# Patient Record
Sex: FEMALE | Race: WHITE | NOT HISPANIC OR LATINO | Employment: FULL TIME | URBAN - NONMETROPOLITAN AREA
[De-identification: names, ages, dates, MRNs, and addresses within clinical notes are randomized per-mention and may not be internally consistent; named-entity substitution may affect disease eponyms.]

---

## 2020-11-11 ENCOUNTER — PREP FOR SURGERY (OUTPATIENT)
Dept: OTHER | Facility: HOSPITAL | Age: 45
End: 2020-11-11

## 2020-11-11 DIAGNOSIS — N20.0 CALCULUS OF KIDNEY: Primary | ICD-10-CM

## 2020-11-13 PROBLEM — N20.0 CALCULUS OF KIDNEY: Status: ACTIVE | Noted: 2020-11-13

## 2020-11-16 ENCOUNTER — LAB (OUTPATIENT)
Dept: LAB | Facility: HOSPITAL | Age: 45
End: 2020-11-16

## 2020-11-16 DIAGNOSIS — Z01.818 PREOP TESTING: Primary | ICD-10-CM

## 2020-11-16 PROCEDURE — U0003 INFECTIOUS AGENT DETECTION BY NUCLEIC ACID (DNA OR RNA); SEVERE ACUTE RESPIRATORY SYNDROME CORONAVIRUS 2 (SARS-COV-2) (CORONAVIRUS DISEASE [COVID-19]), AMPLIFIED PROBE TECHNIQUE, MAKING USE OF HIGH THROUGHPUT TECHNOLOGIES AS DESCRIBED BY CMS-2020-01-R: HCPCS

## 2020-11-16 PROCEDURE — C9803 HOPD COVID-19 SPEC COLLECT: HCPCS

## 2020-11-17 LAB
COVID LABCORP PRIORITY: NORMAL
SARS-COV-2 RNA RESP QL NAA+PROBE: NOT DETECTED

## 2020-11-18 RX ORDER — ELETRIPTAN HYDROBROMIDE 40 MG/1
40 TABLET, FILM COATED ORAL ONCE AS NEEDED
COMMUNITY

## 2020-11-18 RX ORDER — LEVONORGESTREL / ETHINYL ESTRADIOL AND ETHINYL ESTRADIOL 150-30(84)
KIT ORAL DAILY
COMMUNITY

## 2020-11-18 RX ORDER — VENLAFAXINE HYDROCHLORIDE 150 MG/1
150 CAPSULE, EXTENDED RELEASE ORAL DAILY
COMMUNITY

## 2020-11-18 NOTE — H&P
[facility] History and Physical  HUSSEIN TAFOYA  45-year-old; :1975;;female  [address];[place] [address]  Ins: 1) KER[employer]/ELIGIO  Employer: [employer]  MRN:[id number]  NATALIIA BRADSHAW MD  [facility]  Allergies  Ceclor Unknown  Current Medications  Effexor XR 75 mg oral capsule, extended release  Relpax 40 mg oral tablet  * Medications Reconciled  Patient is currently taking birth control as well  Problem List  Kidney stone  Medical History  No medical history.  Patient reports having had a colonoscopy within  the past 9 years.  Surgical History  WISDOM TEETH REMOVED   Other  DNC 10/2010  Obstetric/Gynecologic History  Patient has regular menstrual periods and states  there is no chance she may be pregnant.  Psychiatric History  Patient is generally satisfied with life and has no  thoughts of suicide. Has depression and anxiety.  Family History  Family history of cancer  UTERINE GRANDMOTHER  Father Heart disease  Father Diabetes mellitus  Mother  Father Alive  Social History  Never smoked. Drinks 1-2 alcoholic  drinks/month. Employed. . Lives with her .  Imm/Inj/Office Meds History Comments  Patient has had influenza vaccination for this season.  Has not had pneumonia vaccine.  Chief Complaint  Kidney Stone  History of Present Illness  HUSSEIN TAFOYA is a 45-year-old female here for evaluation. She has a history of kidney stone. ESWL done on left 10/19/20. KUB shows stone on left. C/O left sided pain. Denies N/V. Symptoms present since Sept. Would like to have stone removed. No currently on Flomax. States stone was 5mm on last imaging.  Location: Kidney.  Severity: moderate  Onset / Duration: 2020  Modifying factors: ESWL  Associated signs and symptoms: No dysuria noted. She has 1-2 nocturia per night. She has no frequency. There is no incontinence. There is no urgency. There is no hematuria. There is no  hesitancy. She has a strong stream. She has good control. She has no odor. She always empties well.  Review of Systems  Eyes: Denies: blurry vision, pain in the eyes and double vision  ENMT: Denies: ear pain, sore throat and sinus problems  Cardiovascular: Denies: chest pain, varicose veins, angina and syncope  Respiratory: Denies: shortness of breath, wheezing and frequent cough  Gastrointestinal: Reports: abdominal pain, nausea and vomiting; Denies: indigestion and heartburn  Genitourinary: Reports: other symptoms (see HPI)  Musculoskeletal: Reports: joint pain, neck pain and back pain  Integumentary: Denies: skin rash, boils and persistent itch  Neurological: Reports: numbness / tingling; Denies: tremors and dizzy spells  Psychiatric: Reports: generally satisfied with life; Denies: depression, suicidal  ideation and anxiety  Endocrine: Reports: heat intolerance; Denies: Excessive thirst, cold intolerance and tired/sluggish  Hematologic/Lymphatic: Denies: swollen glands and blood clotting problem  Allergic/Immunologic: Denies: hayfever  Constitutional: Denies: fever, chills and weight loss  Vital Signs  VITAL SIGNS NOT TAKEN DUE TO COVID 19 RESTRICTIONS  11/9/2020 11:03:00 AM  Weight: 203 (lb) Resp Rate: 16 (/min)  Height: 65 (in) BMI: 33.78  Smoking Status: Never smoker  Exam  General appearance: The patient appears well developed and well nourished, in no apparent acute distress.  Assessment of hearing: Hearing appears normal.  Examination of neck: Neck appears supple.  Assessment of respiratory effort: Respiratory effort appears normal. No apparent  distress.  Inspection of breasts: Deferred.  Examination of abdomen: left sided pain  Obtain stool sample: Stool specimen for occult blood is not indicated.  Examination of gait and station: Normal gait and stature.  Head and neck (Assessment of range of motion): Adequate ROM noted in all  extremities.  Head and neck (Assessment of stability): Ambulates without  assistance.  Inspection of skin and subcutaneous tissue: Exam of the skin is within normal limits. The color and skin turgor are normal. No lesions, bruises, rashes, or jaundice.  Orientation to time, place and person: Oriented to person, place, and time.  Mood and affect: Mood and affect are normal.  Orientation: She appears alert and oriented.  Mood and affect: Mood and affect appear normal.  Data Review  Medications and chart reviewed. History and physical form/ROS reviewed and no  changes noted. Previous encounter reviewed. KUB ordered and reviewed by PROVIDER today.  Lab Results  10/12/2020  GLUCOSE NEGATIVE, BILIRUBIN POSITIVE, KETONE NEGATIVE, SPECIFIC  GRAVITY >1.030, BLOOD MODERATE, PH 6.0, PROTEIN POSITIVE, Urobilinogen 0.1 E.U./dl- 1.0 E.U./dl, NITRITE NEGATIVE, LEUKOCYTES TRACE  Assessment - Kidney stone  DX:  Kidney stone  SNO: [id number], ICD-9: 592.0, ICD-10: N20.0  Assessment Notes:  Kidney stone  Plan  Plan Notes:  Cystoscopy, left retrograde, ureteroscopy, laser lithotripsy, stent placement.  Procedures:  123 POST-OP VISIT  Discussion:  Discussed my findings and plan of action and reasoning behind decision making. All questions were answered. FINDINGS WERE DISCUSSED WITH PATIENT. RISKS AND BENEFITS EXPLAINED. PATIENT WISHES TO PROCEED. Instructed to increase fluids. Call if any fever or increased pain.  Instructions:  Patient is instructed to call with any problems. Patient is instructed to call if the  condition worsens. Patient is instructed to call with any changes in condition. Patient is instructed to call if she has any intractable pain, fever, chills, nausea, or vomiting.  INCREASE FLUIDS. IF PAIN WORSENS/ UNCONTROLLED OR TEMPERATURE >101.0 GO IMMEDIATELY TO ER.

## 2020-11-19 ENCOUNTER — ANESTHESIA EVENT (OUTPATIENT)
Dept: PERIOP | Facility: HOSPITAL | Age: 45
End: 2020-11-19

## 2020-11-19 ENCOUNTER — ANESTHESIA (OUTPATIENT)
Dept: PERIOP | Facility: HOSPITAL | Age: 45
End: 2020-11-19

## 2020-11-19 ENCOUNTER — APPOINTMENT (OUTPATIENT)
Dept: GENERAL RADIOLOGY | Facility: HOSPITAL | Age: 45
End: 2020-11-19

## 2020-11-19 ENCOUNTER — HOSPITAL ENCOUNTER (OUTPATIENT)
Facility: HOSPITAL | Age: 45
Setting detail: HOSPITAL OUTPATIENT SURGERY
Discharge: HOME | End: 2020-11-19
Attending: UROLOGY | Admitting: UROLOGY

## 2020-11-19 VITALS
TEMPERATURE: 97.7 F | SYSTOLIC BLOOD PRESSURE: 151 MMHG | OXYGEN SATURATION: 97 % | HEIGHT: 65 IN | HEART RATE: 101 BPM | WEIGHT: 200 LBS | BODY MASS INDEX: 33.32 KG/M2 | RESPIRATION RATE: 18 BRPM | DIASTOLIC BLOOD PRESSURE: 92 MMHG

## 2020-11-19 DIAGNOSIS — N20.0 CALCULUS OF KIDNEY: ICD-10-CM

## 2020-11-19 LAB — B-HCG UR QL: NEGATIVE

## 2020-11-19 PROCEDURE — C1758 CATHETER, URETERAL: HCPCS | Performed by: UROLOGY

## 2020-11-19 PROCEDURE — 25010000002 CEFTRIAXONE: Performed by: UROLOGY

## 2020-11-19 PROCEDURE — 25010000002 MIDAZOLAM PER 1 MG: Performed by: NURSE ANESTHETIST, CERTIFIED REGISTERED

## 2020-11-19 PROCEDURE — C2617 STENT, NON-COR, TEM W/O DEL: HCPCS | Performed by: UROLOGY

## 2020-11-19 PROCEDURE — 25010000002 IOPAMIDOL 61 % SOLUTION: Performed by: UROLOGY

## 2020-11-19 PROCEDURE — 25010000002 PROPOFOL 10 MG/ML EMULSION: Performed by: NURSE ANESTHETIST, CERTIFIED REGISTERED

## 2020-11-19 PROCEDURE — 25010000002 ONDANSETRON PER 1 MG: Performed by: NURSE ANESTHETIST, CERTIFIED REGISTERED

## 2020-11-19 PROCEDURE — 25010000002 FENTANYL CITRATE (PF) 100 MCG/2ML SOLUTION: Performed by: NURSE ANESTHETIST, CERTIFIED REGISTERED

## 2020-11-19 PROCEDURE — 81025 URINE PREGNANCY TEST: CPT | Performed by: ANESTHESIOLOGY

## 2020-11-19 PROCEDURE — C1769 GUIDE WIRE: HCPCS | Performed by: UROLOGY

## 2020-11-19 PROCEDURE — 74420 UROGRAPHY RTRGR +-KUB: CPT

## 2020-11-19 DEVICE — URETERAL STENT
Type: IMPLANTABLE DEVICE | Site: KIDNEY | Status: FUNCTIONAL
Brand: PERCUFLEX™ PLUS

## 2020-11-19 RX ORDER — PROPOFOL 10 MG/ML
VIAL (ML) INTRAVENOUS AS NEEDED
Status: DISCONTINUED | OUTPATIENT
Start: 2020-11-19 | End: 2020-11-19 | Stop reason: SURG

## 2020-11-19 RX ORDER — MEPERIDINE HYDROCHLORIDE 25 MG/ML
12.5 INJECTION INTRAMUSCULAR; INTRAVENOUS; SUBCUTANEOUS
Status: DISCONTINUED | OUTPATIENT
Start: 2020-11-19 | End: 2020-11-20 | Stop reason: HOSPADM

## 2020-11-19 RX ORDER — FENTANYL CITRATE 50 UG/ML
INJECTION, SOLUTION INTRAMUSCULAR; INTRAVENOUS AS NEEDED
Status: DISCONTINUED | OUTPATIENT
Start: 2020-11-19 | End: 2020-11-19

## 2020-11-19 RX ORDER — PROMETHAZINE HYDROCHLORIDE 25 MG/1
25 SUPPOSITORY RECTAL ONCE AS NEEDED
Status: DISCONTINUED | OUTPATIENT
Start: 2020-11-19 | End: 2020-11-20 | Stop reason: HOSPADM

## 2020-11-19 RX ORDER — MIDAZOLAM HYDROCHLORIDE 1 MG/ML
INJECTION INTRAMUSCULAR; INTRAVENOUS AS NEEDED
Status: DISCONTINUED | OUTPATIENT
Start: 2020-11-19 | End: 2020-11-19 | Stop reason: SURG

## 2020-11-19 RX ORDER — SODIUM CHLORIDE, SODIUM GLUCONATE, SODIUM ACETATE, POTASSIUM CHLORIDE, AND MAGNESIUM CHLORIDE 526; 502; 368; 37; 30 MG/100ML; MG/100ML; MG/100ML; MG/100ML; MG/100ML
1000 INJECTION, SOLUTION INTRAVENOUS CONTINUOUS
Status: DISCONTINUED | OUTPATIENT
Start: 2020-11-19 | End: 2020-11-20 | Stop reason: HOSPADM

## 2020-11-19 RX ORDER — ONDANSETRON 2 MG/ML
INJECTION INTRAMUSCULAR; INTRAVENOUS AS NEEDED
Status: DISCONTINUED | OUTPATIENT
Start: 2020-11-19 | End: 2020-11-19 | Stop reason: SURG

## 2020-11-19 RX ORDER — LIDOCAINE HYDROCHLORIDE 20 MG/ML
INJECTION, SOLUTION INFILTRATION; PERINEURAL AS NEEDED
Status: DISCONTINUED | OUTPATIENT
Start: 2020-11-19 | End: 2020-11-19 | Stop reason: SURG

## 2020-11-19 RX ORDER — ONDANSETRON 2 MG/ML
4 INJECTION INTRAMUSCULAR; INTRAVENOUS ONCE AS NEEDED
Status: DISCONTINUED | OUTPATIENT
Start: 2020-11-19 | End: 2020-11-20 | Stop reason: HOSPADM

## 2020-11-19 RX ORDER — FENTANYL CITRATE 50 UG/ML
INJECTION, SOLUTION INTRAMUSCULAR; INTRAVENOUS AS NEEDED
Status: DISCONTINUED | OUTPATIENT
Start: 2020-11-19 | End: 2020-11-19 | Stop reason: SURG

## 2020-11-19 RX ORDER — LIDOCAINE HYDROCHLORIDE 20 MG/ML
INJECTION, SOLUTION INFILTRATION; PERINEURAL AS NEEDED
Status: DISCONTINUED | OUTPATIENT
Start: 2020-11-19 | End: 2020-11-19

## 2020-11-19 RX ORDER — PROMETHAZINE HYDROCHLORIDE 25 MG/1
25 TABLET ORAL ONCE AS NEEDED
Status: DISCONTINUED | OUTPATIENT
Start: 2020-11-19 | End: 2020-11-20 | Stop reason: HOSPADM

## 2020-11-19 RX ADMIN — SODIUM CHLORIDE, SODIUM GLUCONATE, SODIUM ACETATE, POTASSIUM CHLORIDE, AND MAGNESIUM CHLORIDE 1000 ML: 526; 502; 368; 37; 30 INJECTION, SOLUTION INTRAVENOUS at 17:32

## 2020-11-19 RX ADMIN — CEFTRIAXONE 1 G: 1 INJECTION, POWDER, FOR SOLUTION INTRAMUSCULAR; INTRAVENOUS at 22:01

## 2020-11-19 RX ADMIN — MIDAZOLAM HYDROCHLORIDE 2 MG: 2 INJECTION, SOLUTION INTRAMUSCULAR; INTRAVENOUS at 21:50

## 2020-11-19 RX ADMIN — LIDOCAINE HYDROCHLORIDE 50 MG: 20 INJECTION, SOLUTION INFILTRATION; PERINEURAL at 21:59

## 2020-11-19 RX ADMIN — FENTANYL CITRATE 100 MCG: 50 INJECTION, SOLUTION INTRAMUSCULAR; INTRAVENOUS at 21:59

## 2020-11-19 RX ADMIN — PROPOFOL 150 MG: 10 INJECTION, EMULSION INTRAVENOUS at 21:59

## 2020-11-19 RX ADMIN — ONDANSETRON 4 MG: 2 INJECTION INTRAMUSCULAR; INTRAVENOUS at 22:23

## 2020-11-20 NOTE — OP NOTE
23CYSTOSCOPY URETEROSCOPY RETROGRADE PYELOGRAM HOLMIUM LASER STENT INSERTION  Procedure Note    Carmen Hannah  11/19/2020    Pre-op Diagnosis:   Calculus of kidney [N20.0]    Post-op Diagnosis:     Post-Op Diagnosis Codes:     * Calculus of kidney [N20.0]    Procedure(s):  CYSTOSCOPY, LEFT RETROGRADE, URETEROSCOPY, LASER LITHOTRIPSY, STENT PLACEMENT    Surgeon(s):  Sy Patterson MD    Anesthesia: General    Staff:   Circulator: Kathy Burton RN  Radiology Technologist: Sy Lozano  Scrub Person: Yoselin Montgomery  Assistant: Evelyn Nunez CSA    Assistant: Evelyn Nunez CSA was responsible for performing the following activities: Irrigation and their skilled assistance was necessary for the success of this case.     Estimated Blood Loss: minimal    Specimens:                None      Drains:   Urethral Catheter Silicone 16 Fr. (Active)       Findings: Mid to low left lower kidney stone 7 mm    Complications: None    Indications: Same    Description of Procedure: Patient brought to surgery placed in dorsolithotomy position.  Sterile prep and drape genitalia routine fashion. i passed a 20 Fr cystoscope into the bladder. The left ureter was catheterized 6 cc contrast placed up the ureter.  We put an .038 guidewire up into the left renal pelvis and placed a lithovue ureteroscope over top the guidewire into the left renal pelvis. Once this was accomplished we identified the stone 7- 8 mm.  Using a 365 laser fiber with a vibratory 65 dust mode and fragmentation mode we fragmented the stone into small pieces. The .038 guidewire was replaced and the lithovue scope retracted. Then, over the top of the guidewire through the cystoscope we placed an 8 x 26 double-J stent. Fluoroscopy showed the stent to be in good position. A 16 Kiswahili Mendes catheter was placed with 10 cc in the balloon. The patient was taken to recovery having tolerated the procedure well.    Sy Patterson MD     Date:  11/19/2020  Time: 22:33 CST

## 2020-11-20 NOTE — ANESTHESIA PROCEDURE NOTES
Airway  Urgency: elective    Date/Time: 11/19/2020 10:01 PM  Airway not difficult    General Information and Staff    Patient location during procedure: OR  CRNA: Sy Casillas CRNA    Indications and Patient Condition  Indications for airway management: airway protection    Preoxygenated: yes  MILS maintained throughout  Mask difficulty assessment: 0 - not attempted    Final Airway Details  Final airway type: supraglottic airway      Successful airway: LMA and I-gel  Size 4    Number of attempts at approach: 1  Assessment: lips, teeth, and gum same as pre-op    Additional Comments  LMA inserted by Yina Davis SRNA

## 2020-11-20 NOTE — ANESTHESIA POSTPROCEDURE EVALUATION
Patient: Carmen Hannah    Procedure Summary     Date: 11/19/20 Room / Location: [facility] OR 09 / [facility] OR    Anesthesia Start: 2156 Anesthesia Stop: 2238    Procedure: CYSTOSCOPY, LEFT RETROGRADE, URETEROSCOPY, LASER LITHOTRIPSY, STENT PLACEMENT (Left ) Diagnosis:       Calculus of kidney      (Calculus of kidney [N20.0])    Surgeon: Sy Patterson MD Provider: Tom Cordova MD    Anesthesia Type: general ASA Status: 3          Anesthesia Type: general    Vitals  No vitals data found for the desired time range.          Post Anesthesia Care and Evaluation    Patient location during evaluation: PACU  Patient participation: complete - patient participated  Level of consciousness: awake and alert  Pain score: 1  Pain management: adequate  Airway patency: patent  Anesthetic complications: No anesthetic complications  PONV Status: none  Cardiovascular status: acceptable  Respiratory status: acceptable  Hydration status: acceptable  Post Neuraxial Block status: Motor and sensory function returned to baseline

## 2020-11-20 NOTE — DISCHARGE INSTR - APPOINTMENTS
****Call [place] office in the morning to schedule follow up appointment [phone]****      Prescription medicine of percocet and levaquin given

## 2021-03-08 ENCOUNTER — TRANSCRIBE ORDERS (OUTPATIENT)
Dept: ADMINISTRATIVE | Facility: HOSPITAL | Age: 46
End: 2021-03-08

## 2021-03-08 DIAGNOSIS — Z12.31 VISIT FOR SCREENING MAMMOGRAM: Primary | ICD-10-CM

## 2021-03-17 ENCOUNTER — APPOINTMENT (OUTPATIENT)
Dept: MAMMOGRAPHY | Facility: HOSPITAL | Age: 46
End: 2021-03-17

## (undated) DEVICE — SOL IRR NACL 0.9PCT 3000ML

## (undated) DEVICE — SINGLE-USE DIGITAL FLEXIBLE URETEROSCOPE: Brand: LITHOVUE

## (undated) DEVICE — SYS IRR PUMP SGL ACTN VAC SYR 10CC

## (undated) DEVICE — FIBR LASR MOSES 365 DFL 6J 80HZ 120W

## (undated) DEVICE — CATHETER,FOLEY,100%SILICONE,16FR,10ML,LF: Brand: MEDLINE

## (undated) DEVICE — GLV SURG NEOLON 2G PF LF 6.5 STRL

## (undated) DEVICE — CATH URETRL OPN/END 5F70CM

## (undated) DEVICE — GLV SURG NEOLON 2G PF LF 7.5 STRL

## (undated) DEVICE — GW PTFE FIX/CORE FLXTIP .038 3X150CM

## (undated) DEVICE — DRAINBAG,ANTI-REFLUX TOWER,L/F,2000ML,LL: Brand: MEDLINE

## (undated) DEVICE — SOL PVPI SPRY BETADINE 3OZ

## (undated) DEVICE — SOL IRRG H2O PL/BG 1000ML STRL

## (undated) DEVICE — ADAPT/Y SCPE GATEWAY ADVNTGE

## (undated) DEVICE — PK CYSTO LF 60

## (undated) DEVICE — GLV SURG SENSICARE PI PF LF 7 GRN STRL